# Patient Record
Sex: FEMALE | Race: WHITE | NOT HISPANIC OR LATINO | URBAN - NONMETROPOLITAN AREA
[De-identification: names, ages, dates, MRNs, and addresses within clinical notes are randomized per-mention and may not be internally consistent; named-entity substitution may affect disease eponyms.]

---

## 2019-07-08 ENCOUNTER — IMPORTED ENCOUNTER (OUTPATIENT)
Dept: URBAN - NONMETROPOLITAN AREA CLINIC 1 | Facility: CLINIC | Age: 74
End: 2019-07-08

## 2019-07-08 PROBLEM — H40.012: Noted: 2019-07-08

## 2019-07-08 PROBLEM — H25.813: Noted: 2019-07-08

## 2019-07-08 PROCEDURE — 92014 COMPRE OPH EXAM EST PT 1/>: CPT

## 2019-07-08 NOTE — PATIENT DISCUSSION
Cataract(s)-Visually significant cataract OU .-Cataract(s) causing symptomatic impairment of visual function not correctable with a tolerable change in glasses or contact lenses lighting or non-operative means resulting in specific activity limitations and/or participation restrictions including but not limited to reading viewing television driving or meeting vocational or recreational needs. -Expectation is clearer vision and functional improvement in symptoms as well as reduced glare disability after cataract removal.-Order IOLMaster and OPD today. -Recommend Stand\Trad based on today's OPD testing and lifestyle questionnaire.-All questions were answered regarding surgery including pre and post-op medications appointments activity restrictions and anesthetic usage.-The risks benefits and alternatives and special risk factors for the patient were discussed in detail including but not limited to: bleeding infection retinal detachment vitreous loss problems with the implant and possible need for additional surgery.-Although rare the possibility of complete vision loss was discussed.-The possible need for glasses post-operatively was discussed.-Order medical clearance exam.-Patient elects to proceed with cataract surgery OD . Will schedule at patient's convenience and re-evaluate OS  in the future. Glaucoma Suspect OS-Based on C\D ratio and bleeding.  -Appears stable at this time.-Continue to monitor with exams and testing.-Order VF before surgery; 's Notes: PHOBIA OF EYE DROPS***********

## 2019-08-01 PROBLEM — H40.012: Noted: 2019-08-01

## 2019-08-01 PROBLEM — H25.813: Noted: 2019-08-01

## 2019-08-19 ENCOUNTER — IMPORTED ENCOUNTER (OUTPATIENT)
Dept: URBAN - NONMETROPOLITAN AREA CLINIC 1 | Facility: CLINIC | Age: 74
End: 2019-08-19

## 2019-08-19 PROBLEM — Z01.818: Noted: 2019-08-19

## 2019-08-19 PROBLEM — I10: Noted: 2019-08-19

## 2019-08-19 PROBLEM — H40.012: Noted: 2019-08-19

## 2019-08-19 PROBLEM — E11.9: Noted: 2019-08-19

## 2019-08-19 PROBLEM — H25.813: Noted: 2019-08-19

## 2019-08-19 PROBLEM — E78.5: Noted: 2019-08-19

## 2019-08-19 NOTE — PATIENT DISCUSSION
Medical Clearance done todayNo outstanding concernsPatient is cleared for surgery.; 's Notes: PHOBIA OF EYE DROPS***********<br />

## 2019-09-05 ENCOUNTER — IMPORTED ENCOUNTER (OUTPATIENT)
Dept: URBAN - NONMETROPOLITAN AREA CLINIC 1 | Facility: CLINIC | Age: 74
End: 2019-09-05

## 2019-09-05 PROBLEM — Z98.41: Noted: 2019-09-05

## 2019-09-05 PROCEDURE — 66984 XCAPSL CTRC RMVL W/O ECP: CPT

## 2019-09-05 PROCEDURE — 92136 OPHTHALMIC BIOMETRY: CPT

## 2019-09-05 NOTE — PATIENT DISCUSSION
s/p PCIOL OD Stand/Trad 9/5/19 w/ GS -Pt doing well s/p PCIOL. -Continue post-op gtts according to instruction sheet and sleep with eye shield over eye for 7 nights.-Avoid bending at the waist lifting anything over 5lbs and dirty or [place] environments. -RTC 1 week POV; 's Notes: PHOBIA OF EYE DROPS***********

## 2019-09-09 ENCOUNTER — IMPORTED ENCOUNTER (OUTPATIENT)
Dept: URBAN - NONMETROPOLITAN AREA CLINIC 1 | Facility: CLINIC | Age: 74
End: 2019-09-09

## 2019-09-09 PROBLEM — H25.812: Noted: 2019-09-09

## 2019-09-09 PROBLEM — Z01.818: Noted: 2019-09-09

## 2019-09-09 PROBLEM — Z98.41: Noted: 2019-09-09

## 2019-09-09 PROBLEM — E78.5: Noted: 2019-09-09

## 2019-09-09 PROBLEM — I10: Noted: 2019-09-09

## 2019-09-09 PROCEDURE — 92012 INTRM OPH EXAM EST PATIENT: CPT

## 2019-09-09 PROCEDURE — 99024 POSTOP FOLLOW-UP VISIT: CPT

## 2019-09-09 NOTE — PATIENT DISCUSSION
Medical Clearance-Medical clearance done today. -No outstanding concerns that would preclude surgery.-Patient is cleared to proceed with scheduled surgery.; 's Notes: PHOBIA OF EYE DROPS***********

## 2019-09-09 NOTE — PATIENT DISCUSSION
Cataract(s)-Visually significant cataract OS . -Cataract(s) causing symptomatic impairment of visual function not correctable with a tolerable change in glasses or contact lenses lighting or non-operative means resulting in specific activity limitations and/or participation restrictions including but not limited to reading viewing television driving or meeting vocational or recreational needs. -Expectation is clearer vision and functional improvement in symptoms as well as reduced glare disability after cataract removal.-Recommend Standard/Traditional based on previous OPD testing and lifestyle questionnaire.-All questions were answered regarding surgery including pre and post-op medications appointments activity restrictions and anesthetic usage.-The risks benefits and alternatives and special risk factors for the patient were discussed in detail including but not limited to: bleeding infection retinal detachment vitreous loss problems with the implant and possible need for additional surgery.-Although rare the possibility of complete vision loss was discussed.-The need for glasses post-operatively was discussed.-Patient elects to proceed with cataract surgery OS . Will schedule at patient's convenience. s/p PCIOL OD -Pt doing well at 1 week s/p PCIOL. -Continue post-op gtts according to instruction sheet.-Okay to resume usual activites and d/c eye shield.; 's Notes: PHOBIA OF EYE DROPS***********

## 2019-09-18 ENCOUNTER — IMPORTED ENCOUNTER (OUTPATIENT)
Dept: URBAN - NONMETROPOLITAN AREA CLINIC 1 | Facility: CLINIC | Age: 74
End: 2019-09-18

## 2019-09-18 PROBLEM — Z96.1: Noted: 2019-09-18

## 2019-09-18 PROBLEM — Z98.49: Noted: 2019-09-18

## 2019-09-18 PROCEDURE — 99024 POSTOP FOLLOW-UP VISIT: CPT

## 2019-09-18 NOTE — PATIENT DISCUSSION
s/p PCIOL OS-Pt doing well s/p PCIOL. -Continue post-op gtts according to instruction sheet and sleep with eye shield over eye for 7 nights.-Avoid bending at the waist lifting anything over 5lbs and dirty or [place] environments. -RTC .s/p PCIOL OU-Stable PCIOL OU.-Monitor for PCO. .; Dr's Notes: PHOBIA OF EYE DROPS***********

## 2019-09-24 ENCOUNTER — IMPORTED ENCOUNTER (OUTPATIENT)
Dept: URBAN - NONMETROPOLITAN AREA CLINIC 1 | Facility: CLINIC | Age: 74
End: 2019-09-24

## 2019-09-24 NOTE — PATIENT DISCUSSION
"""s/p PCIOL-Pt doing well at 1 week s/p PCIOL. -Continue post-op gtts according to instruction sheet.-Okay to resume usual activites and d/c eye shield.; 's Notes: PHOBIA OF EYE DROPS***********"

## 2019-12-20 ENCOUNTER — IMPORTED ENCOUNTER (OUTPATIENT)
Dept: URBAN - NONMETROPOLITAN AREA CLINIC 1 | Facility: CLINIC | Age: 74
End: 2019-12-20

## 2019-12-20 PROBLEM — Z96.1: Noted: 2019-12-20

## 2019-12-20 PROCEDURE — 92014 COMPRE OPH EXAM EST PT 1/>: CPT

## 2019-12-20 NOTE — PATIENT DISCUSSION
dio juarez; 's Notes: PHOBIA OF EYE DROPS***********[facility] [place] [id number]

## 2022-04-16 ASSESSMENT — VISUAL ACUITY
OD_CC: 20/20
OS_AM: 20/20
OD_CC: 20/30-1
OS_AM: 20/20
OD_PH: 20/30
OS_CC: 20/20
OS_CC: 20/50
OD_CC: 20/25
OD_GLARE: 20/70
OD_SC: 20/25-2
OS_GLARE: 20/50
OS_SC: 20/25
OD_PAM: 20/25
OS_CC: 20/20-2
OS_GLARE: 20/50
OS_CC: 20/25
OD_CC: 20/25
OS_CC: 20/20-
OD_CC: 20/20

## 2022-04-16 ASSESSMENT — TONOMETRY
OS_IOP_MMHG: 18
OD_IOP_MMHG: 17
OD_IOP_MMHG: 16
OD_IOP_MMHG: 23
OD_IOP_MMHG: 15
OS_IOP_MMHG: 15
OS_IOP_MMHG: 16
OS_IOP_MMHG: 17
OD_IOP_MMHG: 19
OD_IOP_MMHG: 12
OD_IOP_MMHG: 13
OS_IOP_MMHG: 24
OS_IOP_MMHG: 20